# Patient Record
Sex: FEMALE | URBAN - METROPOLITAN AREA
[De-identification: names, ages, dates, MRNs, and addresses within clinical notes are randomized per-mention and may not be internally consistent; named-entity substitution may affect disease eponyms.]

---

## 2022-11-10 ENCOUNTER — OFFICE VISIT (OUTPATIENT)
Dept: OBGYN CLINIC | Facility: CLINIC | Age: 27
End: 2022-11-10

## 2022-11-10 VITALS
DIASTOLIC BLOOD PRESSURE: 80 MMHG | WEIGHT: 192 LBS | SYSTOLIC BLOOD PRESSURE: 122 MMHG | HEIGHT: 63 IN | BODY MASS INDEX: 34.02 KG/M2

## 2022-11-10 DIAGNOSIS — Z76.89 ESTABLISHING CARE WITH NEW DOCTOR, ENCOUNTER FOR: Primary | ICD-10-CM

## 2022-11-10 RX ORDER — MULTIVITAMIN WITH IRON
100 TABLET ORAL DAILY
COMMUNITY

## 2022-11-10 NOTE — PROGRESS NOTES
Assessment/Plan     Normal postpartum exam      1  Contraception: none  Discussed short and LARC  2  Annual exam due in unsure  Encouraged pt to schedule WWE/ pap  3  Advised to sign records release from previous ob/gyn  4  Increase activity as tolerated, may resume all normal activity at 6 weeks postpartum  5  Anticipated return to work: 6 - 12 weeks post partum  David Luz is a 23 y o  female who presents for a postpartum visit  She is 6 weeks postpartum following a spontaneous vaginal delivery  Pt delivered at a [facility]   She reports having an uncomplicated prenatal course, labor, delivery, and postpartum  States she went into spontaneous labor  Reports having a tear, but unsure of degree  No records available to review today  She does admit to seeing 2 different providers during her prenatal course  The delivery was at term, per patient  Anesthesia: epidural  Laceration: unsure    Bleeding no bleeding  Bowel function is normal  Bladder function is normal  Patient has not traveled outside the [place]  within the last 14 days  been sexually active  Desired contraception method is none  Baby's course has been unremarkable, per patient       Last Pap : unsure ; no abnormalities  Gestational Diabetes: no  Pregnancy Complications: none per patient    The following portions of the patient's history were reviewed and updated as appropriate: allergies, past family history, past social history and problem list       Current Outpatient Medications:   •  Prenatal Vit-Fe Fumarate-FA (PRENATAL 1+1 PO), Take by mouth, Disp: , Rfl:   •  Prenatal w/o A Vit-Fe Fum-FA (AZESCHEW PRENATAL/ PO), Take by mouth, Disp: , Rfl:   •  pyridoxine (VITAMIN B6) 100 mg tablet, Take 100 mg by mouth daily, Disp: , Rfl:     No Known Allergies    Review of Systems  Constitutional: no fever, feels well  Breasts: no complaints of breast pain, breast lump, or nipple discharge  Gastrointestinal: no complaints nausea, vomiting  Genitourinary: as noted in HPI    Neurological: no complaints of headache      Objective      /80 (BP Location: Right arm, Patient Position: Sitting, Cuff Size: Standard)   Ht 5' 3" (1 6 m)   Wt 87 1 kg (192 lb)   Breastfeeding Yes   BMI 34 01 kg/m²     OBGyn Exam  General: alert and oriented, in no acute distress  Abdomen: soft, non-tender, without masses or organomegaly  Vulva: normal  Vagina: normal mucosa  Cervix: no lesions  Uterus:  normal size  Adnexa: normal adnexa